# Patient Record
Sex: MALE | Race: WHITE | NOT HISPANIC OR LATINO
[De-identification: names, ages, dates, MRNs, and addresses within clinical notes are randomized per-mention and may not be internally consistent; named-entity substitution may affect disease eponyms.]

---

## 2020-01-28 ENCOUNTER — TRANSCRIPTION ENCOUNTER (OUTPATIENT)
Age: 25
End: 2020-01-28

## 2022-03-08 ENCOUNTER — EMERGENCY (EMERGENCY)
Facility: HOSPITAL | Age: 27
LOS: 0 days | Discharge: HOME | End: 2022-03-08
Attending: STUDENT IN AN ORGANIZED HEALTH CARE EDUCATION/TRAINING PROGRAM
Payer: COMMERCIAL

## 2022-03-08 VITALS
DIASTOLIC BLOOD PRESSURE: 77 MMHG | TEMPERATURE: 98 F | RESPIRATION RATE: 18 BRPM | HEART RATE: 62 BPM | SYSTOLIC BLOOD PRESSURE: 124 MMHG | OXYGEN SATURATION: 99 %

## 2022-03-08 VITALS
TEMPERATURE: 98 F | HEIGHT: 69 IN | RESPIRATION RATE: 18 BRPM | HEART RATE: 67 BPM | OXYGEN SATURATION: 98 % | DIASTOLIC BLOOD PRESSURE: 100 MMHG | WEIGHT: 164.91 LBS | SYSTOLIC BLOOD PRESSURE: 141 MMHG

## 2022-03-08 DIAGNOSIS — M54.2 CERVICALGIA: ICD-10-CM

## 2022-03-08 DIAGNOSIS — M54.50 LOW BACK PAIN, UNSPECIFIED: ICD-10-CM

## 2022-03-08 DIAGNOSIS — R51.9 HEADACHE, UNSPECIFIED: ICD-10-CM

## 2022-03-08 DIAGNOSIS — R25.3 FASCICULATION: ICD-10-CM

## 2022-03-08 PROCEDURE — 70450 CT HEAD/BRAIN W/O DYE: CPT | Mod: MA

## 2022-03-08 PROCEDURE — 99284 EMERGENCY DEPT VISIT MOD MDM: CPT

## 2022-03-08 PROCEDURE — 70450 CT HEAD/BRAIN W/O DYE: CPT | Mod: 26,MA

## 2022-03-08 PROCEDURE — 99284 EMERGENCY DEPT VISIT MOD MDM: CPT | Mod: 25

## 2022-03-08 RX ORDER — ACETAMINOPHEN 500 MG
1000 TABLET ORAL ONCE
Refills: 0 | Status: COMPLETED | OUTPATIENT
Start: 2022-03-08 | End: 2022-03-08

## 2022-03-08 RX ADMIN — Medication 1000 MILLIGRAM(S): at 21:35

## 2022-03-08 NOTE — ED STATDOCS - CLINICAL SUMMARY MEDICAL DECISION MAKING FREE TEXT BOX
25 y/o male with headaches for the past few months usually relieved with Aleve and Motrin. Will obtain head CT and outpt neurology follow up.

## 2022-03-08 NOTE — ED STATDOCS - OBJECTIVE STATEMENT
25 y/o male with no significant PMHx presents to the ED c/o headache/head pressure, neck pain, lower back pain, and muscle twitches in face x 1 month which has significantly worsened today. Pt recently traveled on 03/04/2022 and had a very spasm that felt an "electric shock" in his neck for a split second. Pt has been taking Aleve and Motrin with temporary relief. Denies other symptoms. No other complaints at this time.

## 2022-03-08 NOTE — ED STATDOCS - NS_ ATTENDINGSCRIBEDETAILS _ED_A_ED_FT
Ghazala Magaña MD: The history, relevant review of systems, past medical and surgical history, medical decision making, and physical examination was documented by the scribe in my presence and I attest to the accuracy of the documentation.

## 2022-03-08 NOTE — ED ADULT TRIAGE NOTE - CHIEF COMPLAINT QUOTE
headache, neck and back pain x 1 month worsening today. Took motrin with partial relief. Denies nausea, vomiting, light sensitivity, fall or injury.

## 2022-03-08 NOTE — ED STATDOCS - PATIENT PORTAL LINK FT
You can access the FollowMyHealth Patient Portal offered by [facility] by registering at the following website: [email or web address]. By joining [facility]’s FollowMyHealth portal, you will also be able to view your health information using other applications (apps) compatible with our system.

## 2022-03-08 NOTE — ED STATDOCS - NSFOLLOWUPINSTRUCTIONS_ED_ALL_ED_FT
Please follow up with your Primary MD in 1-2 days. Return to ED immediately for any new or concerning symptoms or worsening symptoms    Acute Headache    WHAT YOU NEED TO KNOW:    An acute headache is pain or discomfort that starts suddenly and gets worse quickly. You may have an acute headache only when you feel stress or eat certain foods. Other acute headache pain can happen every day, and sometimes several times a day.     DISCHARGE INSTRUCTIONS:    Return to the emergency department if:     You have severe pain.      You have numbness or weakness on one side of your face or body.      You have a headache that occurs after a blow to the head, a fall, or other trauma.       You have a headache, are forgetful or confused, or have trouble speaking.      You have a headache, stiff neck, and a fever.    Contact your healthcare provider if:     You have a constant headache and are vomiting.      You have a headache each day that does not get better, even after treatment.      You have changes in your headaches, or new symptoms that occur when you have a headache.      You have questions or concerns about your condition or care.    Medicines: You may need any of the following:     Prescription pain medicine may be given. The medicine your healthcare provider recommends will depend on the kind of headaches you have. You will need to take prescription headache medicines as directed to prevent a problem called rebound headache. These headaches happen with regular use of pain relievers for headache disorders.      NSAIDs, such as ibuprofen, help decrease swelling, pain, and fever. This medicine is available with or without a doctor's order. NSAIDs can cause stomach bleeding or kidney problems in certain people. If you take blood thinner medicine, always ask your healthcare provider if NSAIDs are safe for you. Always read the medicine label and follow directions.      Acetaminophen decreases pain and fever. It is available without a doctor's order. Ask how much to take and how often to take it. Follow directions. Read the labels of all other medicines you are using to see if they also contain acetaminophen, or ask your doctor or pharmacist. Acetaminophen can cause liver damage if not taken correctly. Do not use more than 3 grams (3,000 milligrams) total of acetaminophen in one day.       Antidepressants may be given for some kinds of headaches.       Take your medicine as directed. Contact your healthcare provider if you think your medicine is not helping or if you have side effects. Tell him or her if you are allergic to any medicine. Keep a list of the medicines, vitamins, and herbs you take. Include the amounts, and when and why you take them. Bring the list or the pill bottles to follow-up visits. Carry your medicine list with you in case of an emergency.    Manage your symptoms:     Apply heat or ice on the headache area. Use a heat or ice pack. For an ice pack, you can also put crushed ice in a plastic bag. Cover the pack or bag with a towel before you apply it to your skin. Ice and heat both help decrease pain, and heat also helps decrease muscle spasms. Apply heat for 20 to 30 minutes every 2 hours. Apply ice for 15 to 20 minutes every hour. Apply heat or ice for as long and for as many days as directed. You may alternate heat and ice.      Relax your muscles. Lie down in a comfortable position and close your eyes. Relax your muscles slowly. Start at your toes and work your way up your body.      Keep a record of your headaches. Write down when your headaches start and stop. Include your symptoms and what you were doing when the headache began. Record what you ate or drank for 24 hours before the headache started. Describe the pain and where it hurts. Keep track of what you did to treat your headache and if it worked.     Prevent an acute headache:     Avoid anything that triggers an acute headache. Examples include exposure to chemicals, going to high altitude, or not getting enough sleep. Create a regular sleep routine. Go to sleep at the same time and wake up at the same time each day. Do not use electronic devices before bedtime. These may trigger a headache or prevent you from sleeping well.      Do not smoke. Nicotine and other chemicals in cigarettes and cigars can trigger an acute headache or make it worse. Ask your healthcare provider for information if you currently smoke and need help to quit. E-cigarettes or smokeless tobacco still contain nicotine. Talk to your healthcare provider before you use these products.       Limit alcohol as directed. Alcohol can trigger an acute headache or make it worse. If you have cluster headaches, do not drink alcohol during an episode. For other types of headaches, ask your healthcare provider if it is safe for you to drink alcohol. Ask how much is safe for you to drink, and how often.      Exercise as directed. Exercise can reduce tension and help with headache pain. Aim for 30 minutes of physical activity on most days of the week. Your healthcare provider can help you create an exercise plan.      Eat a variety of healthy foods. Healthy foods include fruits, vegetables, low-fat dairy products, lean meats, fish, whole grains, and cooked beans. Your healthcare provider or dietitian can help you create meals plans if you need to avoid foods that trigger headaches.    Follow up with your healthcare provider as directed: Bring your headache record with you when you see your healthcare provider. Write down your questions so you remember to ask them during your visits.

## 2022-03-08 NOTE — ED STATDOCS - NS ED ATTENDING STATEMENT MOD
This was a shared visit with the KATIA. I reviewed and verified the documentation and independently performed the documented:

## 2022-03-08 NOTE — ED STATDOCS - PROGRESS NOTE DETAILS
25 y/o M presents with HA x 1 month. Pt reports intermittent HAs for the past month. Sometimes headache is from the sides of his neck to the back of his head other times he has pain across the front of his head. Pt reports electric shock sensation to the front of his an hr prior to arrival. Pt works on a computer all day. Denies fever/chills, n/v, rashes, weakness or other complaints at this time  Neck: TTP B/L hypertonic trapezius muscle. FROM. neuro:CN2-12 intact. -Diaz Morris PA-C Results reviewed and discussed with pt. Pt feeling better. Suspect msk and tension type headaches. Supportive measures discussed with pt Discussed importance of close FU with PMD. Pt asked to return to ED immediately for any new or concerning sx or worsening. Pt acknowledges and understands plan -Diaz Morris PA-C

## 2022-03-08 NOTE — ED STATDOCS - CARE PROVIDER_API CALL
Jaycob Levin)  Internal Medicine  [address], [address]  [place] [address]  Phone: [phone]  Fax: [phone]  Follow Up Time:     Carol Garcia)  Clinical Neurophysiology; EEGEpilepsy; Neurology; Sleep Medicine  [address] [place] [address], [address] [place] [address]  Phone: [phone]  Fax: [phone]  Follow Up Time:

## 2022-03-08 NOTE — ED STATDOCS - CARE PROVIDERS DIRECT ADDRESSES
,DirectAddress_Unknown,[email or web address]

## 2022-03-08 NOTE — ED STATDOCS - ENMT, MLM
Nonspecific twitching of forehead. Nasal mucosa clear.  Mouth with normal mucosa  Throat has no vesicles, no oropharyngeal exudates and uvula is midline. 3 = assistive equipment and person

## 2022-03-08 NOTE — ED ADULT NURSE NOTE - OBJECTIVE STATEMENT
pt presents to the ED with c/o headache, neck and back pain x 1 month worsening today. Took motrin with partial relief.

## 2022-06-24 PROBLEM — Z00.00 ENCOUNTER FOR PREVENTIVE HEALTH EXAMINATION: Status: ACTIVE | Noted: 2022-06-24

## 2022-06-29 ENCOUNTER — NON-APPOINTMENT (OUTPATIENT)
Age: 27
End: 2022-06-29

## 2022-06-29 ENCOUNTER — APPOINTMENT (OUTPATIENT)
Dept: NEUROLOGY | Facility: CLINIC | Age: 27
End: 2022-06-29
Payer: COMMERCIAL

## 2022-06-29 VITALS
WEIGHT: 174 LBS | DIASTOLIC BLOOD PRESSURE: 87 MMHG | HEART RATE: 74 BPM | SYSTOLIC BLOOD PRESSURE: 127 MMHG | BODY MASS INDEX: 25.77 KG/M2 | HEIGHT: 69 IN

## 2022-06-29 DIAGNOSIS — Z83.3 FAMILY HISTORY OF DIABETES MELLITUS: ICD-10-CM

## 2022-06-29 DIAGNOSIS — Z84.89 FAMILY HISTORY OF OTHER SPECIFIED CONDITIONS: ICD-10-CM

## 2022-06-29 DIAGNOSIS — Z82.49 FAMILY HISTORY OF ISCHEMIC HEART DISEASE AND OTHER DISEASES OF THE CIRCULATORY SYSTEM: ICD-10-CM

## 2022-06-29 DIAGNOSIS — Z83.49 FAMILY HISTORY OF OTHER ENDOCRINE, NUTRITIONAL AND METABOLIC DISEASES: ICD-10-CM

## 2022-06-29 DIAGNOSIS — Z80.0 FAMILY HISTORY OF MALIGNANT NEOPLASM OF DIGESTIVE ORGANS: ICD-10-CM

## 2022-06-29 DIAGNOSIS — E78.00 PURE HYPERCHOLESTEROLEMIA, UNSPECIFIED: ICD-10-CM

## 2022-06-29 DIAGNOSIS — Z78.9 OTHER SPECIFIED HEALTH STATUS: ICD-10-CM

## 2022-06-29 DIAGNOSIS — Z83.511 FAMILY HISTORY OF GLAUCOMA: ICD-10-CM

## 2022-06-29 PROCEDURE — 99204 OFFICE O/P NEW MOD 45 MIN: CPT

## 2022-06-29 NOTE — DATA REVIEWED
[de-identified] : CT head 3/8/22:\par No acute intracranial hemorrhage, mass effect or cerebral infarction.\par Patchy paranasal sinus disease. Acute sinusitis should be excluded clinically.

## 2022-06-29 NOTE — PHYSICAL EXAM
[FreeTextEntry1] : Examination:\par Constitutional: normal, no apparent distress\par Eyes: normal conjunctiva b/l, no ptosis, visual fields full\par Respiratory: no respiratory distress, normal effort, normal auscultation\par Cardiovascular: normal rate, rhythm, no murmurs\par Neck: supple, no masses\par Vascular: carotids normal\par Skin: normal color, no rashes\par Psych: normal mood, affect\par \par Neurological:\par Memory: normal memory, oriented to person, place, time\par Language intact/no aphasia\par Cranial Nerves: II-XII intact, Pupils equally round and reactive to light, ocular muscles/movements intact, no ptosis, no facial weakness, tongue protrudes normally in the midline, \par Motor: normal tone, no pronator drift, full strength in all four extremities in the proximal and distal muscle groups\par Coordination: Fine motor movements intact, rapid alternating movements intact, finger to nose intact bilaterally\par Sensory: intact to light touch, vibration, joint position sense, negative Romberg examination\par DTRs: symmetric, 2+ in b/l triceps, 2+ in b/l biceps, 2+ in b/l brachioradialis, 2+ in bilateral patellars, 2+ in bilateral Achilles, Babinskis negative bilaterally\par Gait: narrow based, steady, able to walk on heels, toes, tandem gait\par \par Tenderness over both temples/TMJ joints.

## 2022-06-29 NOTE — DISCUSSION/SUMMARY
[FreeTextEntry1] : Mr. ADITYA Cunha is a 27 year old man with chronic daily headaches and neck pain.\par He has also noted some involuntary head and facial movements.\par His neurological examination is non-focal other than some tenderness over his temples/TMJs.\par \par Chronic Daily Headache\par -Will order MRI brain with and without contrast.\par -I do think that his headache frequency warrants a daily preventative medication. \par -He has been prescribed propranolol LA 60 mg which I do think is a good option. If not helpful, nortriptyline can also be tried.\par -Prior to starting a daily preventative medication, he can try a muscle relaxant for a few nights to try to relieve some muscle tension. I will prescribe cyclobenzaprine 10 mg qhs x 1 week and then at night as needed. This may cause drowsiness.\par -Avoid daily use of NSAIDs. He can continue to use Aleve sparingly.\par \par Neck Pain\par -Will request MRI cervical spine as disc disease may be contributing to his complaints\par \par Involuntary facial movements\par -Unclear if these are truly tics or response to pain, relieving muscle tension.\par -Will follow as headaches are treated.\par \par f/u after imaging, sooner if needed.

## 2022-06-29 NOTE — HISTORY OF PRESENT ILLNESS
[FreeTextEntry1] : Mr. ADITYA Cunha is here today for neurology evaluation.\par He reports daily headaches for over one year.\par In March headaches were worse following flying. \par He had a sharp pain from his neck radiating up to his head for a few seconds. \par He went to the ED and had a negative CT other than patchy paranasal sinus disease. He was unaware of these findings.\par \par Headaches are primarily located over his temples and described as a dull, consistent pain.\par He also has sharp pains in the right side of his head just posterior to the top.\par He has some associated photophobia, phonophobia. He denies nausea. When headaches are severe he has some blurry vision.\par He has also developed some involuntary tics such as raising his eyebrows and nodding his head.\par He does not note a positional difference but does feel better when he wakes up in the morning. His symptoms start about an hour after waking up.\par \par He continues to have neck pain which is localized in his neck without radiation into his arms. \par He denies weakness or numbness in his arms.\par \par He takes Aleve when pain is bad but avoids daily use.\par He was also given a medrol dose pack by his PCP, Dr. Boxer.\par He was also prescribed propranolol but did not start.\par \par He does think that he may grind his teeth.\par He saw his dentist who suggested removing his wisdom teeth.\par He also saw his eye doctor.\par \par He is not aware of snoring.\par He does not have a history of asthma.

## 2022-06-29 NOTE — CONSULT LETTER
[Dear  ___] : Dear  [unfilled], [Consult Letter:] : I had the pleasure of evaluating your patient, [unfilled]. [Please see my note below.] : Please see my note below. [Consult Closing:] : Thank you very much for allowing me to participate in the care of this patient.  If you have any questions, please do not hesitate to contact me. [FreeTextEntry2] : Jonathan Boxer [FreeTextEntry3] : Sincerely,\par \par \par Carol Garcia MD\par Diplomate, American Academy of Psychiatry and Neurology\par Board Certified in the Subspecialty of Clinical Neurophysiology\par Board Certified in the Subspecialty of Sleep Medicine\par Board Certified in the Subspecialty of Epilepsy\par

## 2022-07-12 ENCOUNTER — APPOINTMENT (OUTPATIENT)
Dept: MRI IMAGING | Facility: CLINIC | Age: 27
End: 2022-07-12

## 2022-07-12 ENCOUNTER — OUTPATIENT (OUTPATIENT)
Dept: OUTPATIENT SERVICES | Facility: HOSPITAL | Age: 27
LOS: 1 days | End: 2022-07-12
Payer: COMMERCIAL

## 2022-07-12 ENCOUNTER — TRANSCRIPTION ENCOUNTER (OUTPATIENT)
Age: 27
End: 2022-07-12

## 2022-07-12 DIAGNOSIS — R51.9 HEADACHE, UNSPECIFIED: ICD-10-CM

## 2022-07-12 PROCEDURE — 70553 MRI BRAIN STEM W/O & W/DYE: CPT | Mod: 26

## 2022-07-12 PROCEDURE — A9585: CPT

## 2022-07-12 PROCEDURE — 72141 MRI NECK SPINE W/O DYE: CPT

## 2022-07-12 PROCEDURE — 72141 MRI NECK SPINE W/O DYE: CPT | Mod: 26

## 2022-07-12 PROCEDURE — 70553 MRI BRAIN STEM W/O & W/DYE: CPT

## 2022-07-13 ENCOUNTER — NON-APPOINTMENT (OUTPATIENT)
Age: 27
End: 2022-07-13

## 2022-07-14 ENCOUNTER — TRANSCRIPTION ENCOUNTER (OUTPATIENT)
Age: 27
End: 2022-07-14

## 2022-09-21 ENCOUNTER — NON-APPOINTMENT (OUTPATIENT)
Age: 27
End: 2022-09-21

## 2023-01-18 ENCOUNTER — APPOINTMENT (OUTPATIENT)
Dept: NEUROLOGY | Facility: CLINIC | Age: 28
End: 2023-01-18
Payer: COMMERCIAL

## 2023-01-18 VITALS
WEIGHT: 186 LBS | HEIGHT: 69 IN | DIASTOLIC BLOOD PRESSURE: 81 MMHG | BODY MASS INDEX: 27.55 KG/M2 | HEART RATE: 72 BPM | SYSTOLIC BLOOD PRESSURE: 136 MMHG | TEMPERATURE: 97.8 F

## 2023-01-18 PROCEDURE — 99213 OFFICE O/P EST LOW 20 MIN: CPT

## 2023-01-18 NOTE — HISTORY OF PRESENT ILLNESS
[FreeTextEntry1] : 6/29/22:\par Mr. ADITYA Cunha is here today for neurology evaluation.\par He reports daily headaches for over one year.\par In March headaches were worse following flying. \par He had a sharp pain from his neck radiating up to his head for a few seconds. \par He went to the ED and had a negative CT other than patchy paranasal sinus disease. He was unaware of these findings.\par \par Headaches are primarily located over his temples and described as a dull, consistent pain.\par He also has sharp pains in the right side of his head just posterior to the top.\par He has some associated photophobia, phonophobia. He denies nausea. When headaches are severe he has some blurry vision.\par He has also developed some involuntary tics such as raising his eyebrows and nodding his head.\par He does not note a positional difference but does feel better when he wakes up in the morning. His symptoms start about an hour after waking up.\par \par He continues to have neck pain which is localized in his neck without radiation into his arms. \par He denies weakness or numbness in his arms.\par \par He takes Aleve when pain is bad but avoids daily use.\par He was also given a medrol dose pack by his PCP, Dr. Boxer.\par He was also prescribed propranolol but did not start.\par \par He does think that he may grind his teeth.\par He saw his dentist who suggested removing his wisdom teeth.\par He also saw his eye doctor.\par \par He is not aware of snoring.\par He does not have a history of asthma.\par \par \par \par 1/18/23:\par Headaches are improved, no longer daily.\par He now has headaches ~ two days a week, still located over the temples.\par He is not taking a daily medication. He takes cyclobenzaprine at night when his headache is bad.\par There are times when he has a sensation of drooping in the right side of his face. There is no droop or weakness when he looks in the mirror. He feels a numbness, but can feel everything when he touches his face.\par Sometimes this occurs with a headache and sometimes it is isolated.\par \par He continues to have neck pain. He uses Aleve as needed during the day.\par \par He has gone for some massages which have helped.\par \par He still has some involuntary facial movements.\par \par

## 2023-01-18 NOTE — DATA REVIEWED
[de-identified] : MR head with and without contrast 7/12/22:\par \par 1)  Unremarkable pre and postcontrast MRI study of the brain. No enhancing brain lesion, ischemic changes, or demyelinating foci noted.\par 2)  mild residual mucosal thickening in the sinuses with interval improvement as compared to prior CT study of 3/8/2022. Minimal right mastoid partial opacification.\par  [de-identified] : CT head 3/8/22:\par No acute intracranial hemorrhage, mass effect or cerebral infarction.\par Patchy paranasal sinus disease. Acute sinusitis should be excluded clinically.\par \par MR cervical spine 7/14/22:\par Mild multilevel degenerative changes as described above notable for mild canal stenosis at C3-C4 and mild left foraminal stenosis at C3-C4, C4-C5. No abnormal cord signal identified within the cervical spine.

## 2023-01-18 NOTE — DISCUSSION/SUMMARY
[FreeTextEntry1] : Mr. ADITYA Cunha is a 27 year old man with chronic daily headaches and neck pain.\par He has also noted some involuntary head and facial movements.\par His neurological examination is non-focal other than some tenderness over his temples/TMJs.\par \par Chronic Daily Headache\par -MRI brain with and without contrast did not show any significant pathology.\par -Sinus disease had improved since previous CT scan.\par -He would like to continue to avoid daily medications.\par -Can continue cyclobenzaprine as needed at nighttime.\par -Advised to inquire with his insurance company to see if acupuncture is covered.\par -Advised to call if he changes his mind about a daily preventative medication.  Nortriptyline can be considered.  Alternatively, propranolol can be considered if he requires medication to treat high blood pressure.\par - Avoid daily use of NSAIDs\par -Sensation of facial droop/numbness may be secondary to muscle tension. \par -We will also refer for physical therapy/myofascial release.\par \par Neck Pain\par -MRI cervical spine shows mild multilevel degenerative changes without any changes in the cord.\par -Cyclobenzaprine is quite helpful but causes drowsiness.\par -Can try methocarbamol 750 mg during the day to see if this causes less drowsiness.\par -Physical therapy/myofascial release.\par -Acupuncture if covered by insurance.\par \par \par Involuntary facial movements\par -Unclear if these are truly tics or response to pain, relieving muscle tension.\par -These have persisted but are not causing significant problems at this time.\par \par f/u 6 months, sooner if needed.\par \par

## 2023-07-01 ENCOUNTER — NON-APPOINTMENT (OUTPATIENT)
Age: 28
End: 2023-07-01

## 2023-10-24 ENCOUNTER — APPOINTMENT (OUTPATIENT)
Dept: NEUROLOGY | Facility: CLINIC | Age: 28
End: 2023-10-24
Payer: COMMERCIAL

## 2023-10-24 VITALS
HEIGHT: 69 IN | HEART RATE: 60 BPM | DIASTOLIC BLOOD PRESSURE: 81 MMHG | BODY MASS INDEX: 27.4 KG/M2 | TEMPERATURE: 98.2 F | WEIGHT: 185 LBS | SYSTOLIC BLOOD PRESSURE: 123 MMHG

## 2023-10-24 DIAGNOSIS — R51.9 HEADACHE, UNSPECIFIED: ICD-10-CM

## 2023-10-24 DIAGNOSIS — F95.9 TIC DISORDER, UNSPECIFIED: ICD-10-CM

## 2023-10-24 DIAGNOSIS — M54.2 CERVICALGIA: ICD-10-CM

## 2023-10-24 DIAGNOSIS — M47.812 SPONDYLOSIS W/OUT MYELOPATHY OR RADICULOPATHY, CERVICAL REGION: ICD-10-CM

## 2023-10-24 PROCEDURE — 99214 OFFICE O/P EST MOD 30 MIN: CPT

## 2023-10-24 RX ORDER — METHOCARBAMOL 750 MG/1
750 TABLET, FILM COATED ORAL
Qty: 60 | Refills: 3 | Status: DISCONTINUED | COMMUNITY
Start: 2023-01-18 | End: 2023-10-24

## 2023-10-24 RX ORDER — CYCLOBENZAPRINE HYDROCHLORIDE 10 MG/1
10 TABLET, FILM COATED ORAL
Qty: 30 | Refills: 3 | Status: ACTIVE | COMMUNITY
Start: 2022-06-29 | End: 1900-01-01

## 2023-10-24 RX ORDER — NORTRIPTYLINE HYDROCHLORIDE 25 MG/1
25 CAPSULE ORAL
Qty: 30 | Refills: 3 | Status: ACTIVE | COMMUNITY
Start: 2023-10-24 | End: 1900-01-01

## 2024-03-29 ENCOUNTER — APPOINTMENT (OUTPATIENT)
Dept: NEUROLOGY | Facility: CLINIC | Age: 29
End: 2024-03-29

## 2024-06-18 ENCOUNTER — NON-APPOINTMENT (OUTPATIENT)
Age: 29
End: 2024-06-18

## 2024-09-04 NOTE — ED STATDOCS - DATE/TIME 2
----- Message from Sally Church sent at 9/4/2024 10:11 AM CDT -----  Type:  Patient Returning Call    Who Called:raymond   Who Left Message for Patient:nurse   Does the patient know what this is regarding?:missed the call   Would the patient rather a call back or a response via [employer]? call    Best Call Back Number: [phone]  Additional Information:  
Spoke with patient to confirmed appointment for friday   
08-Mar-2022 21:50

## 2024-10-31 ENCOUNTER — NON-APPOINTMENT (OUTPATIENT)
Age: 29
End: 2024-10-31

## 2024-10-31 ENCOUNTER — APPOINTMENT (OUTPATIENT)
Dept: NEUROLOGY | Facility: CLINIC | Age: 29
End: 2024-10-31
Payer: COMMERCIAL

## 2024-10-31 VITALS
TEMPERATURE: 98.2 F | HEART RATE: 69 BPM | SYSTOLIC BLOOD PRESSURE: 121 MMHG | BODY MASS INDEX: 23.99 KG/M2 | WEIGHT: 162 LBS | HEIGHT: 69 IN | DIASTOLIC BLOOD PRESSURE: 82 MMHG

## 2024-10-31 DIAGNOSIS — M47.812 SPONDYLOSIS W/OUT MYELOPATHY OR RADICULOPATHY, CERVICAL REGION: ICD-10-CM

## 2024-10-31 DIAGNOSIS — G44.86 CERVICOGENIC HEADACHE: ICD-10-CM

## 2024-10-31 DIAGNOSIS — R51.9 HEADACHE, UNSPECIFIED: ICD-10-CM

## 2024-10-31 PROCEDURE — G2211 COMPLEX E/M VISIT ADD ON: CPT | Mod: NC

## 2024-10-31 PROCEDURE — 99213 OFFICE O/P EST LOW 20 MIN: CPT

## 2024-11-22 ENCOUNTER — APPOINTMENT (OUTPATIENT)
Dept: PAIN MANAGEMENT | Facility: CLINIC | Age: 29
End: 2024-11-22
Payer: COMMERCIAL

## 2024-11-22 ENCOUNTER — NON-APPOINTMENT (OUTPATIENT)
Age: 29
End: 2024-11-22

## 2024-11-22 VITALS
DIASTOLIC BLOOD PRESSURE: 82 MMHG | WEIGHT: 165 LBS | HEIGHT: 69 IN | BODY MASS INDEX: 24.44 KG/M2 | TEMPERATURE: 98.2 F | SYSTOLIC BLOOD PRESSURE: 128 MMHG | HEART RATE: 63 BPM

## 2024-11-22 PROCEDURE — 99214 OFFICE O/P EST MOD 30 MIN: CPT

## 2024-11-22 PROCEDURE — 99204 OFFICE O/P NEW MOD 45 MIN: CPT

## 2024-11-22 RX ORDER — RIZATRIPTAN BENZOATE 10 MG/1
10 TABLET ORAL
Qty: 9 | Refills: 0 | Status: ACTIVE | COMMUNITY
Start: 2024-11-22 | End: 1900-01-01

## 2025-02-06 ENCOUNTER — APPOINTMENT (OUTPATIENT)
Dept: PAIN MANAGEMENT | Facility: CLINIC | Age: 30
End: 2025-02-06